# Patient Record
Sex: FEMALE | Race: WHITE | NOT HISPANIC OR LATINO | URBAN - METROPOLITAN AREA
[De-identification: names, ages, dates, MRNs, and addresses within clinical notes are randomized per-mention and may not be internally consistent; named-entity substitution may affect disease eponyms.]

---

## 2017-02-06 PROBLEM — J96.01 ACUTE RESPIRATORY FAILURE WITH HYPOXIA: Status: ACTIVE | Noted: 2017-02-06

## 2017-02-06 PROBLEM — N30.00 ACUTE CYSTITIS WITHOUT HEMATURIA: Status: ACTIVE | Noted: 2017-02-06

## 2017-02-06 PROBLEM — E87.20 METABOLIC ACIDOSIS: Status: ACTIVE | Noted: 2017-02-06

## 2017-02-06 PROBLEM — Z86.59 HISTORY OF SCHIZOPHRENIA: Status: ACTIVE | Noted: 2017-02-06

## 2017-02-06 PROBLEM — N28.9 RENAL INSUFFICIENCY: Status: ACTIVE | Noted: 2017-02-06

## 2017-02-06 PROBLEM — R79.89 ELEVATED TROPONIN: Status: ACTIVE | Noted: 2017-02-06

## 2017-02-06 PROBLEM — E78.5 HYPERLIPIDEMIA: Status: ACTIVE | Noted: 2017-02-06

## 2017-02-06 PROBLEM — J96.01 ACUTE HYPOXEMIC RESPIRATORY FAILURE: Status: ACTIVE | Noted: 2017-02-06

## 2017-02-06 PROBLEM — D53.9 MACROCYTIC ANEMIA: Status: ACTIVE | Noted: 2017-02-06

## 2017-02-06 PROBLEM — E87.70 FLUID OVERLOAD: Status: ACTIVE | Noted: 2017-02-06

## 2017-02-06 PROBLEM — R00.0 SINUS TACHYCARDIA: Status: ACTIVE | Noted: 2017-02-06

## 2017-02-08 PROBLEM — I42.8 NONISCHEMIC CARDIOMYOPATHY: Status: ACTIVE | Noted: 2017-02-08

## 2017-02-08 PROBLEM — I50.41 ACUTE COMBINED SYSTOLIC AND DIASTOLIC HEART FAILURE: Status: ACTIVE | Noted: 2017-02-08

## 2017-02-09 PROBLEM — I47.29 NONSUSTAINED VENTRICULAR TACHYCARDIA: Status: ACTIVE | Noted: 2017-02-09

## 2017-02-09 PROBLEM — I95.2 HYPOTENSION DUE TO DRUGS: Status: ACTIVE | Noted: 2017-02-09

## 2017-02-09 PROBLEM — E87.6 HYPOKALEMIA: Status: ACTIVE | Noted: 2017-02-09

## 2017-02-21 PROBLEM — R06.03 ACUTE RESPIRATORY DISTRESS: Status: ACTIVE | Noted: 2017-02-21

## 2017-02-21 PROBLEM — D64.9 ANEMIA: Status: ACTIVE | Noted: 2017-02-21

## 2017-02-21 PROBLEM — E87.5 HYPERKALEMIA: Status: ACTIVE | Noted: 2017-02-21

## 2017-02-21 PROBLEM — I25.10 CAD (CORONARY ARTERY DISEASE): Status: ACTIVE | Noted: 2017-02-21

## 2017-02-21 PROBLEM — D62 ACUTE BLOOD LOSS ANEMIA: Status: ACTIVE | Noted: 2017-02-21

## 2017-02-21 PROBLEM — N18.5 CKD (CHRONIC KIDNEY DISEASE) STAGE 5, GFR LESS THAN 15 ML/MIN: Status: ACTIVE | Noted: 2017-02-21

## 2017-02-21 PROBLEM — I50.43 ACUTE ON CHRONIC COMBINED SYSTOLIC AND DIASTOLIC CONGESTIVE HEART FAILURE: Status: ACTIVE | Noted: 2017-02-21

## 2017-03-24 ENCOUNTER — OFFICE VISIT (OUTPATIENT)
Dept: FAMILY MEDICINE | Facility: CLINIC | Age: 57
End: 2017-03-24
Payer: MEDICARE

## 2017-03-24 VITALS
BODY MASS INDEX: 35.88 KG/M2 | OXYGEN SATURATION: 98 % | WEIGHT: 182.75 LBS | SYSTOLIC BLOOD PRESSURE: 118 MMHG | HEART RATE: 73 BPM | HEIGHT: 60 IN | DIASTOLIC BLOOD PRESSURE: 74 MMHG

## 2017-03-24 DIAGNOSIS — I25.10 CORONARY ARTERY DISEASE, ANGINA PRESENCE UNSPECIFIED, UNSPECIFIED VESSEL OR LESION TYPE, UNSPECIFIED WHETHER NATIVE OR TRANSPLANTED HEART: ICD-10-CM

## 2017-03-24 DIAGNOSIS — Z86.59 HISTORY OF SCHIZOPHRENIA: ICD-10-CM

## 2017-03-24 DIAGNOSIS — N18.5 CKD (CHRONIC KIDNEY DISEASE) STAGE 5, GFR LESS THAN 15 ML/MIN: ICD-10-CM

## 2017-03-24 DIAGNOSIS — I50.43 ACUTE ON CHRONIC COMBINED SYSTOLIC AND DIASTOLIC CONGESTIVE HEART FAILURE: Primary | ICD-10-CM

## 2017-03-24 PROCEDURE — 99999 PR PBB SHADOW E&M-EST. PATIENT-LVL III: CPT | Mod: PBBFAC,,, | Performed by: FAMILY MEDICINE

## 2017-03-24 PROCEDURE — 99204 OFFICE O/P NEW MOD 45 MIN: CPT | Mod: S$PBB,,, | Performed by: FAMILY MEDICINE

## 2017-03-24 PROCEDURE — 99213 OFFICE O/P EST LOW 20 MIN: CPT | Mod: PBBFAC,PO | Performed by: FAMILY MEDICINE

## 2017-03-24 RX ORDER — CARVEDILOL 3.12 MG/1
6.25 TABLET ORAL 2 TIMES DAILY
COMMUNITY
Start: 2017-03-10 | End: 2017-05-19 | Stop reason: SDUPTHER

## 2017-03-24 RX ORDER — FLUTICASONE FUROATE AND VILANTEROL 100; 25 UG/1; UG/1
1 POWDER RESPIRATORY (INHALATION) DAILY
Qty: 60 EACH | Refills: 2 | Status: SHIPPED | OUTPATIENT
Start: 2017-03-24 | End: 2017-05-07

## 2017-03-24 NOTE — MR AVS SNAPSHOT
[facility]  [address] [facility]  [facility] [address]  Phone: [phone]  Fax: [phone]                  Jordyn Craven   3/24/2017 10:00 AM   Office Visit    Description:  Female : 1960   Provider:  Kamron Myers MD   Department:  [facility]           Reason for Visit     Establish Care           Diagnoses this Visit        Comments    History of schizophrenia    -  Primary     Coronary artery disease, angina presence unspecified, unspecified vessel or lesion type, unspecified whether native or transplanted heart         Acute on chronic combined systolic and diastolic congestive heart failure         CKD (chronic kidney disease) stage 5, GFR less than 15 ml/min                To Do List           Future Appointments        Provider Department Dept Phone    2017 8:20 AM Kamron Myers MD [facility] [phone]      Goals (5 Years of Data)     None      Follow-Up and Disposition     Return in about 4 weeks (around 2017).       These Medications        Disp Refills Start End    fluticasone-vilanterol (BREO ELLIPTA) 100-25 mcg/dose diskus inhaler 60 each 2 3/24/2017     Inhale 1 puff into the lungs once daily. Controller - Inhalation    Pharmacy: [id number] - [place], [place] [address] #: [phone]         [facility]     [facility] Care [facility] -  Assistance  Registered nurses in the [facility] provide clinical advisement, health education, appointment booking, and other advisory services.  Call for this free service at [phone].             Medications           Message regarding Medications     Verify the changes and/or additions to your medication regime listed below are the same as discussed with your clinician today.  If any of these changes or additions are incorrect, please notify your healthcare provider.        STOP taking these medications     docusate sodium  (COLACE) 100 MG capsule Take 100 mg by mouth 2 (two) times daily.    ferrous gluconate (FERGON) 324 MG tablet Take 1 tablet (324 mg total) by mouth 3 (three) times daily with meals.    levalbuterol (XOPENEX) 0.63 mg/3 mL nebulizer solution Take 3 mLs (0.63 mg total) by nebulization every 8 (eight) hours as needed (shortness of breath). Rescue    metoprolol succinate (TOPROL-XL) 25 MG 24 hr tablet Take 3 tablets (75 mg total) by mouth once daily.    tolterodine (DETROL LA) 4 MG 24 hr capsule            Verify that the below list of medications is an accurate representation of the medications you are currently taking.  If none reported, the list may be blank. If incorrect, please contact your healthcare provider. Carry this list with you in case of emergency.           Current Medications     bisacodyl (DULCOLAX) 5 mg EC tablet Take 2 tablets (10 mg total) by mouth daily as needed for Constipation.    calcitRIOL (ROCALTROL) 0.5 MCG Cap Take 0.5 mcg by mouth. 1 capsule by mouth 5 times a week patient states medication taken Monday,tuesday,wednesday,thursday and friday    carvedilol (COREG) 3.125 MG tablet     fluticasone-vilanterol (BREO ELLIPTA) 100-25 mcg/dose diskus inhaler Inhale 1 puff into the lungs once daily. Controller    furosemide (LASIX) 20 MG tablet Take 3 tablets (60 mg total) by mouth once daily.    hydrALAZINE (APRESOLINE) 25 MG tablet Take 1 tablet (25 mg total) by mouth 2 (two) times daily.    isosorbide mononitrate (IMDUR) 30 MG 24 hr tablet Take 1 tablet (30 mg total) by mouth once daily.    polyethylene glycol (GLYCOLAX) 17 gram PwPk Take 17 g by mouth 2 (two) times daily as needed.    pravastatin (PRAVACHOL) 40 MG tablet Take 40 mg by mouth every evening.    quetiapine (SEROQUEL) 400 MG tablet Take 400 mg by mouth every evening.    spironolactone (ALDACTONE) 25 MG tablet Take 1 tablet (25 mg total) by mouth once daily.           Clinical Reference Information           Your Vitals Were     BP  Pulse Height Weight SpO2 BMI    118/74 73 5' (1.524 m) 82.9 kg (182 lb 12.2 oz) 98% 35.69 kg/m2      Blood Pressure          Most Recent Value    BP  118/74      Allergies as of 3/24/2017     No Known Allergies      Immunizations Administered on Date of Encounter - 3/24/2017     None      Maintenance Dialysis History     Patient has no recorded history of maintenance dialysis.      [facility] Sign-Up     Activating your [facility] account is as easy as 1-2-3!     1) Visit [email or web address], select Sign Up Now, enter this activation code and your date of birth, then select Next.  [email or web address]  Expires: 3/28/2017  6:17 PM      2) Create a username and password to use when you visit [facility] in the future and select a security question in case you lose your password and select Next.    3) Enter your e-mail address and click Sign Up!    Additional Information  If you have questions, please e-mail [email or web address] or call [phone] to talk to our [facility] staff. Remember, [facility] is NOT to be used for urgent needs. For medical emergencies, dial 911.         Language Assistance Services     ATTENTION: Language assistance services are available, free of charge. Please call [phone].      ATENCIÓN: Si habla español, tiene a quezada disposición servicios gratuitos de asistencia lingüística. Llame al [phone].     CHÚ Ý: N?u b?n nói Ti?ng Vi?t, có các d?ch v? h? tr? ngôn ng? mi?n phí dành cho b?n. G?i s? [phone].         [facility] complies with applicable Federal civil rights laws and does not discriminate on the basis of race, color, national origin, age, disability, or sex.

## 2017-04-03 RX ORDER — QUETIAPINE FUMARATE 400 MG/1
400 TABLET, FILM COATED ORAL NIGHTLY
Qty: 90 TABLET | Refills: 2 | Status: SHIPPED | OUTPATIENT
Start: 2017-04-03

## 2017-04-03 NOTE — TELEPHONE ENCOUNTER
----- Message from Priscila Whitehead sent at 4/3/2017  8:19 AM CDT -----  Contact: self  Patient is requesting a refill on SEROQUEL      Please send to    [id number] [place] [facility] [address] [address]  Phone: [phone] Fax: [phone]

## 2017-04-07 ENCOUNTER — PATIENT OUTREACH (OUTPATIENT)
Dept: ADMINISTRATIVE | Facility: HOSPITAL | Age: 57
End: 2017-04-07

## 2017-04-07 NOTE — LETTER
April 7, 2017    Jordyn Craven  [address] [facility] [address]             [facility]  [address]  [facility] [address]  Phone: [phone] Dear Mrs. Craven:    [facility] is committed to your overall health.  To help you get the most out of each of your visits, we will review your information to make sure you are up to date on all of your recommended tests and/or procedures.      Dr. Myers has found that you may be due for One time Hepatitis C screening lab test ( a viral condition that harms the liver), pap smear, mammogram, colonoscopy     If you have had any of the above done at another facility, please bring the records or information with you so that your record at [facility] will be complete.  If you would like to schedule any of these, please contact me.    If you are currently taking medication, please bring it with you to your appointment for review.            If you have any questions or concerns, please don't hesitate to call.    Sincerely,        Julita Rendon LPN Clinical Care Coordinator  [facility] [address] [place] [address]  [phone] (p)   [phone] (f)

## 2017-04-21 ENCOUNTER — OFFICE VISIT (OUTPATIENT)
Dept: FAMILY MEDICINE | Facility: CLINIC | Age: 57
End: 2017-04-21
Payer: MEDICARE

## 2017-04-21 VITALS
OXYGEN SATURATION: 96 % | WEIGHT: 173.06 LBS | HEART RATE: 72 BPM | BODY MASS INDEX: 33.98 KG/M2 | HEIGHT: 60 IN | DIASTOLIC BLOOD PRESSURE: 64 MMHG | SYSTOLIC BLOOD PRESSURE: 112 MMHG

## 2017-04-21 DIAGNOSIS — F03.90 DEMENTIA WITHOUT BEHAVIORAL DISTURBANCE, UNSPECIFIED DEMENTIA TYPE: ICD-10-CM

## 2017-04-21 DIAGNOSIS — D64.9 ANEMIA, UNSPECIFIED TYPE: ICD-10-CM

## 2017-04-21 DIAGNOSIS — Z86.59 HISTORY OF SCHIZOPHRENIA: ICD-10-CM

## 2017-04-21 DIAGNOSIS — N39.0 URINARY TRACT INFECTION WITHOUT HEMATURIA, SITE UNSPECIFIED: Primary | ICD-10-CM

## 2017-04-21 DIAGNOSIS — N18.5 CKD (CHRONIC KIDNEY DISEASE) STAGE 5, GFR LESS THAN 15 ML/MIN: ICD-10-CM

## 2017-04-21 PROCEDURE — 99999 PR PBB SHADOW E&M-EST. PATIENT-LVL IV: CPT | Mod: PBBFAC,,, | Performed by: FAMILY MEDICINE

## 2017-04-21 PROCEDURE — 99214 OFFICE O/P EST MOD 30 MIN: CPT | Mod: S$PBB,,, | Performed by: FAMILY MEDICINE

## 2017-04-21 PROCEDURE — 99214 OFFICE O/P EST MOD 30 MIN: CPT | Mod: PBBFAC,PO | Performed by: FAMILY MEDICINE

## 2017-04-21 RX ORDER — TOLTERODINE 4 MG/1
4 CAPSULE, EXTENDED RELEASE ORAL DAILY
COMMUNITY

## 2017-04-21 RX ORDER — METOPROLOL SUCCINATE 25 MG/1
75 TABLET, EXTENDED RELEASE ORAL DAILY
Status: ON HOLD | COMMUNITY
Start: 2017-04-10 | End: 2017-05-12 | Stop reason: HOSPADM

## 2017-04-21 RX ORDER — ZIPRASIDONE HYDROCHLORIDE 40 MG/1
40 CAPSULE ORAL DAILY
Status: ON HOLD | COMMUNITY
Start: 2017-04-20 | End: 2017-05-12 | Stop reason: HOSPADM

## 2017-04-21 RX ORDER — NITROFURANTOIN 25; 75 MG/1; MG/1
CAPSULE ORAL
COMMUNITY
Start: 2017-04-20 | End: 2017-05-07

## 2017-04-21 RX ORDER — SODIUM BICARBONATE 650 MG/1
650 TABLET ORAL 3 TIMES DAILY
COMMUNITY

## 2017-04-30 NOTE — PROGRESS NOTES
A pleasant female here today, 57 years of age.  She has a history of chronic   kidney disease, stage V.  She has a shunt in the right arm, sees Dr. Hirsch in   Nephrology.  She sees Dr. Plaza in Cardiology.  She was recently seen at [facility] in [place], [place] for UTI and placed on Cipro.  She does   have a history of dementia, chronic anemia and last creatinine 4.4.  She also   has a history of schizophrenia.    PHYSICAL EXAMINATION:  GENERAL:  Pleasant female.  Her affect was controlled and polite today.  VITAL SIGNS:  Normal.  ABDOMEN:  No CVA, suprapubic or abdominal tenderness.  CHEST:  Clear.  EXTREMITIES:  No edema in the upper or lower extremities.  She did have a shunt   in the right arm for her dialysis, I would assume.  SKIN:  No skin breakdown or ulcerations in the lower extremities of any acute   nature.    ASSESSMENT:  Chronic kidney disease, stage V, urinary tract infection, dementia,   anemia.    PLAN:  At this point, I reviewed her medications.  She is doing better overall.    She is obviously going to continue to be followed by Nephrology, Psychiatry and   Cardiology.  She is asymptomatic from a urinary tract standpoint.  At this   point, nothing new to add.  She will continue followup with Nephrology   primarily.      VKP/PN  dd: 04/30/2017 10:57:47 (CDT)  td: 04/30/2017 20:39:01 (CDT)  Doc ID   #[id number]  Job ID #[id number]    CC:

## 2017-05-08 PROBLEM — R79.89 ELEVATED TROPONIN: Status: RESOLVED | Noted: 2017-02-06 | Resolved: 2017-05-08

## 2017-05-08 PROBLEM — I50.42 CHRONIC COMBINED SYSTOLIC AND DIASTOLIC CONGESTIVE HEART FAILURE: Status: ACTIVE | Noted: 2017-02-21

## 2017-05-08 PROBLEM — R07.9 CHEST PAIN: Status: ACTIVE | Noted: 2017-05-08

## 2017-05-08 PROBLEM — N17.9 AKI (ACUTE KIDNEY INJURY): Status: ACTIVE | Noted: 2017-05-08

## 2017-05-08 PROBLEM — N30.00 ACUTE CYSTITIS WITHOUT HEMATURIA: Status: ACTIVE | Noted: 2017-05-08

## 2017-05-12 DIAGNOSIS — Z12.31 OTHER SCREENING MAMMOGRAM: ICD-10-CM

## 2017-09-15 DIAGNOSIS — Z12.11 COLON CANCER SCREENING: ICD-10-CM

## 2022-01-04 NOTE — MR AVS SNAPSHOT
[facility]  [address] [facility]  [facility] [address]  Phone: [phone]  Fax: [phone]                  Jordyn Craven   2017 8:40 AM   Office Visit    Description:  Female : 1960   Provider:  Kamron Myers MD   Department:  [facility]           Reason for Visit     Follow-up           Diagnoses this Visit        Comments    CKD (chronic kidney disease) stage 5, GFR less than 15 ml/min    -  Primary     Urinary tract infection without hematuria, site unspecified         Dementia without behavioral disturbance, unspecified dementia type         History of schizophrenia                To Do List           Goals (5 Years of Data)     None      Follow-Up and Disposition     Return in about 3 months (around 2017).      [facility]     [facility] Care [facility] -  Assistance  Unless otherwise directed by your provider, please contact [facility], our nurse care line that is available for  assistance.     Registered nurses in the [facility] provide: appointment scheduling, clinical advisement, health education, and other advisory services.  Call: [phone] (toll free)               Medications           Message regarding Medications     Verify the changes and/or additions to your medication regime listed below are the same as discussed with your clinician today.  If any of these changes or additions are incorrect, please notify your healthcare provider.             Verify that the below list of medications is an accurate representation of the medications you are currently taking.  If none reported, the list may be blank. If incorrect, please contact your healthcare provider. Carry this list with you in case of emergency.           Current Medications     bisacodyl (DULCOLAX) 5 mg EC tablet Take 2 tablets (10 mg total) by mouth daily as needed for Constipation.    calcitRIOL (ROCALTROL) 0.5 MCG Cap Take 0.5 mcg  by mouth. 1 capsule by mouth 5 times a week patient states medication taken Monday,tuesday,wednesday,thursday and friday    carvedilol (COREG) 3.125 MG tablet     fluticasone-vilanterol (BREO ELLIPTA) 100-25 mcg/dose diskus inhaler Inhale 1 puff into the lungs once daily. Controller    furosemide (LASIX) 20 MG tablet Take 3 tablets (60 mg total) by mouth once daily.    hydrALAZINE (APRESOLINE) 25 MG tablet Take 1 tablet (25 mg total) by mouth 2 (two) times daily.    isosorbide mononitrate (IMDUR) 30 MG 24 hr tablet Take 1 tablet (30 mg total) by mouth once daily.    metoprolol succinate (TOPROL-XL) 25 MG 24 hr tablet     nitrofurantoin, macrocrystal-monohydrate, (MACROBID) 100 MG capsule     polyethylene glycol (GLYCOLAX) 17 gram PwPk Take 17 g by mouth 2 (two) times daily as needed.    pravastatin (PRAVACHOL) 40 MG tablet Take 40 mg by mouth every evening.    quetiapine (SEROQUEL) 400 MG tablet Take 1 tablet (400 mg total) by mouth every evening.    sodium bicarbonate 650 MG tablet Take 650 mg by mouth 3 (three) times daily.    spironolactone (ALDACTONE) 25 MG tablet Take 1 tablet (25 mg total) by mouth once daily.    tolterodine (DETROL LA) 4 MG 24 hr capsule Take 4 mg by mouth once daily.    ziprasidone (GEODON) 40 MG Cap            Clinical Reference Information           Your Vitals Were     BP Pulse Height Weight SpO2 BMI    112/64 72 5' (1.524 m) 78.5 kg (173 lb 1 oz) 96% 33.8 kg/m2      Blood Pressure          Most Recent Value    BP  112/64      Allergies as of 4/21/2017     No Known Allergies      Immunizations Administered on Date of Encounter - 4/21/2017     None      Maintenance Dialysis History     Patient has no recorded history of maintenance dialysis.      [facility] Sign-Up     Activating your [facility] account is as easy as 1-2-3!     1) Visit [email or web address], select Sign Up Now, enter this activation code and your date of birth, then select Next.  [email or web address]  Expires: 6/5/2017 10:14 AM       2) Create a username and password to use when you visit [facility] in the future and select a security question in case you lose your password and select Next.    3) Enter your e-mail address and click Sign Up!    Additional Information  If you have questions, please e-mail [email or web address] or call [phone] to talk to our [facility] staff. Remember, [facility] is NOT to be used for urgent needs. For medical emergencies, dial 911.         Language Assistance Services     ATTENTION: Language assistance services are available, free of charge. Please call [phone].      ATENCIÓN: Si habla español, tiene a quezada disposición servicios gratuitos de asistencia lingüística. Llame al [phone].     CHÚ Ý: N?u b?n nói Ti?ng Vi?t, có các d?ch v? h? tr? ngôn ng? mi?n phí dành cho b?n. G?i s? [phone].         [facility] complies with applicable Federal civil rights laws and does not discriminate on the basis of race, color, national origin, age, disability, or sex.         High Dose Vitamin A Counseling: Side effects reviewed, pt to contact office should one occur.

## 2023-11-13 NOTE — PROGRESS NOTES
A pleasant female, 57 years of age.  This is a new patient.  Past medical,   surgical and social history reviewed.  She is a former smoker.  She has got a   history of bipolar disorder, congestive heart failure, chronic kidney disease,   COPD and schizophrenia.  She had a long smoking history.    MEDICATIONS:  Reviewed.    She said she has been followed by Dr. Redd, who is a PCP, Dr. Plaza, her   cardiologist.  She also has been followed at [facility], has a   LifeVest with 25% ejection fraction.  No history of coronary artery disease.    She is not a smoker now.    PHYSICAL EXAMINATION:  Vital signs normal.  She is in no apparent distress   today.  No clubbing, cyanosis or peripheral edema.  No jugular venous   distention.  No wheezes or rhonchi.  Moist mucous membranes.  Good skin turgor.    ASSESSMENT:  Congestive heart failure, chronic kidney disease stage V, coronary   artery disease and schizophrenia.    She is obviously followed by multiple specialists, especially Cardiology.    Reestablish care.  Reviewed her history.  We discussed health maintenance and   cancer screening.  We reviewed her medications.  At this point, her conditions   are stable.  We will see her back shortly after further review of her chart and   conditions.      LULA/ELMER  dd: 04/02/2017 21:30:23 (CDT)  td: 04/03/2017 01:14:20 (CDT)  Doc ID   #[id number]  Job ID #[id number]    CC:        Spoke with patient he is having left knee and left leg numbness. Informed patient he should be evaluated in the ER for this and he will have his wife take him to [facility] ER and would like Dr Ge to review the ER note after he goes